# Patient Record
Sex: MALE | URBAN - METROPOLITAN AREA
[De-identification: names, ages, dates, MRNs, and addresses within clinical notes are randomized per-mention and may not be internally consistent; named-entity substitution may affect disease eponyms.]

---

## 2017-11-29 ENCOUNTER — RECORDS - HEALTHEAST (OUTPATIENT)
Dept: LAB | Facility: CLINIC | Age: 22
End: 2017-11-29

## 2017-11-29 LAB — HIV 1+2 AB+HIV1 P24 AG SERPL QL IA: NEGATIVE

## 2024-03-03 ENCOUNTER — HOSPITAL ENCOUNTER (EMERGENCY)
Facility: OTHER | Age: 29
Discharge: HOME | End: 2024-03-03
Attending: EMERGENCY MEDICINE
Payer: COMMERCIAL

## 2024-03-03 VITALS
TEMPERATURE: 97.6 F | RESPIRATION RATE: 16 BRPM | SYSTOLIC BLOOD PRESSURE: 131 MMHG | HEART RATE: 59 BPM | DIASTOLIC BLOOD PRESSURE: 80 MMHG | OXYGEN SATURATION: 99 %

## 2024-03-03 DIAGNOSIS — F32.A DEPRESSION, UNSPECIFIED DEPRESSION TYPE: ICD-10-CM

## 2024-03-03 DIAGNOSIS — R45.851 SUICIDAL IDEATION: ICD-10-CM

## 2024-03-03 DIAGNOSIS — F43.24 ADJUSTMENT DISORDER WITH DISTURBANCE OF CONDUCT: ICD-10-CM

## 2024-03-03 RX ORDER — HYDROXYZINE PAMOATE 25 MG/1
25-50 CAPSULE ORAL 3 TIMES DAILY PRN
Qty: 15 CAPSULE | Refills: 0 | Status: SHIPPED | OUTPATIENT
Start: 2024-03-03

## 2024-03-03 ASSESSMENT — COLUMBIA-SUICIDE SEVERITY RATING SCALE - C-SSRS
2. HAVE YOU ACTUALLY HAD ANY THOUGHTS OF KILLING YOURSELF IN THE PAST MONTH?: NO
6. HAVE YOU EVER DONE ANYTHING, STARTED TO DO ANYTHING, OR PREPARED TO DO ANYTHING TO END YOUR LIFE?: NO
1. IN THE PAST MONTH, HAVE YOU WISHED YOU WERE DEAD OR WISHED YOU COULD GO TO SLEEP AND NOT WAKE UP?: NO

## 2024-03-03 ASSESSMENT — ENCOUNTER SYMPTOMS
VOMITING: 0
DYSPHORIC MOOD: 1
NAUSEA: 0
DYSURIA: 0
FEVER: 0
CHILLS: 0
CHEST TIGHTNESS: 0
ARTHRALGIAS: 0
SHORTNESS OF BREATH: 0
LIGHT-HEADEDNESS: 0

## 2024-03-03 ASSESSMENT — ACTIVITIES OF DAILY LIVING (ADL)
ADLS_ACUITY_SCORE: 35

## 2024-03-03 NOTE — ED TRIAGE NOTES
"Pt arrives to ER via private vehicle with his parents. Pt reports that his parents wanted him to come in for mental health evaluation and medication. Pt reports that he was taking xanax last summer but didn't like it due to sleeping ll the time. Pt reports \"I hate everybody right now\"  Pt reports that he is willing to try new medication if it will help. Pt denies SI or HI.         "

## 2024-03-03 NOTE — DISCHARGE INSTRUCTIONS
[facility] partners with you local crisis response team (CRT) to provide crisis follow up after you leave the hospital. Someone from the CRT team will be reaching out to you via phone, or will schedule an in person visit as appropriate. You can also reach CRT by calling [phone]. Clients in the [facility] ([place], [place], [place], [place], [place], [facility] and [facility]) can dial 211 for immediate assistance. Their website is [email or web address]    They will also assist with connecting to Outpatient Therapy
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
## 2024-03-03 NOTE — ED NOTES
Placed call to the Crisis Response Team (CRT) at [phone]. Spoke with staff and provided clinical information. CRT will follow up upon pt discharging to the community by phone or in person, as appropriate. Faxed assessment to CRT at [phone]. They will assist patient with connecting to OP services.

## 2024-03-03 NOTE — Clinical Note
Jesus Fletcher was seen and treated in our emergency department on 3/3/2024.  He may return to work on 03/04/2024.  He needs to be seen in clinic sometime this week for follow-up.     If you have any questions or concerns, please don't hesitate to call.      Abdifatah Hwang MD

## 2024-03-04 ENCOUNTER — OFFICE VISIT (OUTPATIENT)
Dept: FAMILY MEDICINE | Facility: OTHER | Age: 29
End: 2024-03-04
Attending: FAMILY MEDICINE
Payer: COMMERCIAL

## 2024-03-04 VITALS
HEART RATE: 66 BPM | SYSTOLIC BLOOD PRESSURE: 110 MMHG | TEMPERATURE: 98.7 F | BODY MASS INDEX: 25.33 KG/M2 | DIASTOLIC BLOOD PRESSURE: 60 MMHG | RESPIRATION RATE: 20 BRPM | OXYGEN SATURATION: 99 % | WEIGHT: 157.6 LBS | HEIGHT: 66 IN

## 2024-03-04 DIAGNOSIS — F32.89 OTHER DEPRESSION: Primary | ICD-10-CM

## 2024-03-04 DIAGNOSIS — F12.10 CANNABIS ABUSE: ICD-10-CM

## 2024-03-04 PROCEDURE — 99214 OFFICE O/P EST MOD 30 MIN: CPT | Performed by: FAMILY MEDICINE

## 2024-03-04 PROCEDURE — G0463 HOSPITAL OUTPT CLINIC VISIT: HCPCS

## 2024-03-04 ASSESSMENT — ANXIETY QUESTIONNAIRES
7. FEELING AFRAID AS IF SOMETHING AWFUL MIGHT HAPPEN: SEVERAL DAYS
3. WORRYING TOO MUCH ABOUT DIFFERENT THINGS: NEARLY EVERY DAY
GAD7 TOTAL SCORE: 17
6. BECOMING EASILY ANNOYED OR IRRITABLE: NEARLY EVERY DAY
GAD7 TOTAL SCORE: 17
1. FEELING NERVOUS, ANXIOUS, OR ON EDGE: NEARLY EVERY DAY
IF YOU CHECKED OFF ANY PROBLEMS ON THIS QUESTIONNAIRE, HOW DIFFICULT HAVE THESE PROBLEMS MADE IT FOR YOU TO DO YOUR WORK, TAKE CARE OF THINGS AT HOME, OR GET ALONG WITH OTHER PEOPLE: VERY DIFFICULT
8. IF YOU CHECKED OFF ANY PROBLEMS, HOW DIFFICULT HAVE THESE MADE IT FOR YOU TO DO YOUR WORK, TAKE CARE OF THINGS AT HOME, OR GET ALONG WITH OTHER PEOPLE?: VERY DIFFICULT
GAD7 TOTAL SCORE: 17
5. BEING SO RESTLESS THAT IT IS HARD TO SIT STILL: SEVERAL DAYS
7. FEELING AFRAID AS IF SOMETHING AWFUL MIGHT HAPPEN: SEVERAL DAYS
4. TROUBLE RELAXING: NEARLY EVERY DAY
2. NOT BEING ABLE TO STOP OR CONTROL WORRYING: NEARLY EVERY DAY

## 2024-03-04 ASSESSMENT — PAIN SCALES - GENERAL: PAINLEVEL: NO PAIN (0)

## 2024-03-04 NOTE — NURSING NOTE
Patient here for ER follow up for depression and anxiety. Medication Reconciliation: complete.    Valentina Sanchez LPN  3/4/2024 3:45 PM

## 2024-03-04 NOTE — COMMUNITY RESOURCES LIST (ENGLISH)
03/04/2024   [facility]  N/A  For questions about this resource list or additional care needs, please contact your primary care clinic or care manager.  Phone: [phone]   Email: N/A   Address: [address] [place] [id number]   Hours: N/A        Food and Nutrition       Food pantry  1  [facility] Food Shelf Distance: 6.84 miles      In-Person   [address] [place] [address]  Language: English  Hours: Wed 10:00 AM - 12:00 PM  Fees: Free   Phone: (247) [phone]     2  Pending sale to [facility] Distance: 24.76 miles      In-Person, Pickup   [address] [place] [address]  Language: English  Hours: Thu 9:00 AM - 4:00 PM  Fees: Free   Phone: (883) 866-5083 Email: office@[employer] Website: [email or web address]-food-[email or web address]          Transportation       Free or low-cost transportation  3  [facility] [employer] Office - [employer] - Free or Low-cost Transportation Distance: 24.57 miles      In-Person   [address] [place] [address]  Language: English  Hours: Mon - Fri 8:00 AM - 4:30 PM  Fees: Free   Phone: (428) 237-4291 Email: contactus@[employer] Website: https://www.[employer]org/component/mymaplocations/[employer]-office          Important Numbers & Websites       Emergency Services   911  [facility] Services   311  Poison Control   (483) 920-3007  Suicide Prevention Lifeline   (755) 284-3305 (TALK)  Child Abuse Hotline   (749) 106-2144 (4-A-Child)  Sexual Assault Hotline   (741) 619-4436 (HOPE)  National Runaway Safeline   (957) 644-8839 (RUNAWAY)  All-Options Talkline   (743) 506-9326  Substance Abuse Referral   (917) 750-9289 (HELP)

## 2024-03-05 PROBLEM — F12.10 CANNABIS ABUSE: Status: ACTIVE | Noted: 2024-03-05

## 2024-03-05 NOTE — PROGRESS NOTES
"  SUBJECTIVE:   Jesus Fletcher is a 28 year old male who presents to clinic today for the following health issues: ER follow-up    Patient arrives here for ER follow-up.  He was seen for depression and anxiety suicidal ideation.  He was started on hydroxyzine.  He reports that he has been on multiple medications in the past and none seem to work except his cannabis.  He finds that the only medication that helps him relax.  He is avoided all other drugs because they are \"chemicals\".  He has been set up for mental health provider follow-up.  He is not sure when the appointment is.  States he believes his mother knows.  Sounds like he is been set up with counseling and medical management.    History of Present Illness       Mental Health Follow-up:  Patient presents to follow-up on Depression & Anxiety.Patient's depression since last visit has been:  Better  The patient is not having other symptoms associated with depression.  Patient's anxiety since last visit has been:  Bad  The patient is not having other symptoms associated with anxiety.  Any significant life events: relationship concerns and financial concerns  Patient is feeling anxious or having panic attacks.  Patient has no concerns about alcohol or drug use.    He eats 0-1 servings of fruits and vegetables daily.He consumes 2 sweetened beverage(s) daily.He exercises with enough effort to increase his heart rate 60 or more minutes per day.  He exercises with enough effort to increase his heart rate 3 or less days per week.   He is taking medications regularly.        Patient Active Problem List    Diagnosis Date Noted    Cannabis abuse 03/05/2024     Priority: Medium    Adjustment disorder with disturbance of conduct 03/03/2024     Priority: Medium     No past medical history on file.   No past surgical history on file.    Review of Systems     OBJECTIVE:     /60   Pulse 66   Temp 98.7  F (37.1  C)   Resp 20   Ht 1.676 m (5' 6\")   Wt 71.5 kg (157 lb " 9.6 oz)   SpO2 99%   BMI 25.44 kg/m    Body mass index is 25.44 kg/m .  Physical Exam  Constitutional:       Comments: Easily distracted.  Appears to have trouble concentrating.   Psychiatric:         Mood and Affect: Mood normal.      Comments: Patient denies being suicidal or having thoughts of self-harm his thought content does wander a little bit.         Diagnostic Test Results:  none     ASSESSMENT/PLAN:         (F32.89) Other depression  (primary encounter diagnosis)  Comment: Had a lengthy discussion with the patient about starting an SSRI.  He states he will try it maybe for 1 month.  But he will have to research the chemicals that are in it.  He has full trust in his marijuana to relieve his symptoms.  Mental health referral put in.  Encourage cut down on his marijuana.  Does not seem to be receptive to this.  Plan: FLUoxetine (PROZAC) 20 MG capsule            (F12.10) Cannabis abuse  Comment:   Plan:       Lyle Glass MD  [facility] [place] AND [facility]